# Patient Record
Sex: MALE | URBAN - METROPOLITAN AREA
[De-identification: names, ages, dates, MRNs, and addresses within clinical notes are randomized per-mention and may not be internally consistent; named-entity substitution may affect disease eponyms.]

---

## 2022-07-28 ENCOUNTER — APPOINTMENT (RX ONLY)
Dept: URBAN - METROPOLITAN AREA CLINIC 45 | Facility: CLINIC | Age: 52
Setting detail: DERMATOLOGY
End: 2022-07-28

## 2022-07-28 DIAGNOSIS — L50.3 DERMATOGRAPHIC URTICARIA: ICD-10-CM

## 2022-07-28 DIAGNOSIS — L71.8 OTHER ROSACEA: ICD-10-CM

## 2022-07-28 PROCEDURE — ? COUNSELING

## 2022-07-28 PROCEDURE — ? TREATMENT REGIMEN

## 2022-07-28 PROCEDURE — ? PRESCRIPTION

## 2022-07-28 PROCEDURE — 99203 OFFICE O/P NEW LOW 30 MIN: CPT

## 2022-07-28 RX ORDER — OXYMETAZOLINE HYDROCHLORIDE 1 G/100G
CREAM TOPICAL
Qty: 30 | Refills: 3 | Status: ERX | COMMUNITY
Start: 2022-07-28

## 2022-07-28 RX ORDER — DOXYCYCLINE HYCLATE 200 MG/1
TABLET, DELAYED RELEASE ORAL
Qty: 30 | Refills: 3 | Status: ERX | COMMUNITY
Start: 2022-07-28

## 2022-07-28 RX ORDER — SULFACETAMIDE SODIUM, SULFUR 98; 48 MG/G; MG/G
LIQUID TOPICAL
Qty: 285 | Refills: 3 | Status: ERX | COMMUNITY
Start: 2022-07-28

## 2022-07-28 RX ADMIN — OXYMETAZOLINE HYDROCHLORIDE: 1 CREAM TOPICAL at 00:00

## 2022-07-28 RX ADMIN — SULFACETAMIDE SODIUM, SULFUR: 98; 48 LIQUID TOPICAL at 00:00

## 2022-07-28 RX ADMIN — DOXYCYCLINE HYCLATE: 200 TABLET, DELAYED RELEASE ORAL at 00:00

## 2022-07-28 NOTE — PROCEDURE: TREATMENT REGIMEN
Plan: Location: body\\n\\nDermatographism was drawn to detect histamine level. \\nPatient has high histamine level and advised to start taking Allegra BID, especially during allergy season.\\nF/u as needed
Detail Level: Zone
Plan: Location: face\\nPrescribe: Doryx 200mg ( patient is to take one tablet daily or as needed) \\nPlexion sulfa Cleanser ( patient is to lather to skin allow to sit for 30 seconds and then rinse) \\nRhofade topical cream ( patient is to apply to affected areas every morning) \\n\\n\\n\\nPatient presents with erythema. \\nDiscussed with the patient that it is an immune mediated condition that irritates the blood vessels and oil glands\\nEducated patient on trigger factors such as stress, spicy foods, alcohol, sunlight, etc.\\nRecommend using sunscreen with zinc oxide to help prevent trigger factors from activating\\n\\n\\nPatient currently has a prescription from a former dermatologist that she is to continue to use, within tins compounding medication there is Azalaic acid, metronidazole and ivermectin. \\nDiscussed with patient that we will also prescribe patient Rhofade cream and Doryx 200mg to be used daily to help with inflammation and irritation. \\Aurelio addition to this treatment regimen, will prescribe patient Plexion sulfa Cleanser (10-30 sec) to further help calm down inflammation.\\n\\n\\n\\nPLAN: \\n\\n1. Patient is to continue compounding medication daily that was given to her by her former dermatologist \\n2. Patient is to apply Rhofade topical cream every morning \\n3. Patient is to take oral Doryx 200mg daily with food \\n4. Patient is to cleanse face with Plexion topical cleanser daily \\n5. Patient is to take over the counter oral antihistamine (3) tablets daily \\n\\n\\n\\nPatient is to follow up in 3 months

## 2022-08-04 ENCOUNTER — RX ONLY (OUTPATIENT)
Age: 52
Setting detail: RX ONLY
End: 2022-08-04

## 2022-08-04 RX ORDER — DOXYCYCLINE HYCLATE 100 MG/1
CAPSULE, GELATIN COATED ORAL
Qty: 60 | Refills: 3 | Status: ERX | COMMUNITY
Start: 2022-08-04

## 2022-10-27 ENCOUNTER — APPOINTMENT (RX ONLY)
Dept: URBAN - METROPOLITAN AREA CLINIC 45 | Facility: CLINIC | Age: 52
Setting detail: DERMATOLOGY
End: 2022-10-27

## 2022-10-27 DIAGNOSIS — L71.8 OTHER ROSACEA: ICD-10-CM

## 2022-10-27 DIAGNOSIS — L98.8 OTHER SPECIFIED DISORDERS OF THE SKIN AND SUBCUTANEOUS TISSUE: ICD-10-CM

## 2022-10-27 DIAGNOSIS — L50.3 DERMATOGRAPHIC URTICARIA: ICD-10-CM

## 2022-10-27 PROCEDURE — ? PRESCRIPTION

## 2022-10-27 PROCEDURE — ? TREATMENT REGIMEN

## 2022-10-27 PROCEDURE — ? COUNSELING

## 2022-10-27 PROCEDURE — 99213 OFFICE O/P EST LOW 20 MIN: CPT

## 2022-10-27 RX ORDER — DOXYCYCLINE HYCLATE 200 MG/1
TABLET, DELAYED RELEASE ORAL
Qty: 30 | Refills: 3 | Status: ERX

## 2022-10-27 RX ORDER — OXYMETAZOLINE HYDROCHLORIDE 1 G/100G
CREAM TOPICAL
Qty: 30 | Refills: 3 | Status: ERX

## 2022-10-27 RX ORDER — SULFACETAMIDE SODIUM, SULFUR 98; 48 MG/G; MG/G
LIQUID TOPICAL
Qty: 285 | Refills: 3 | Status: ERX

## 2022-10-27 NOTE — PROCEDURE: TREATMENT REGIMEN
Detail Level: Zone
Plan: Location: face\\nPrescribe: Doryx 200mg ( patient is to take one tablet daily or as needed) \\nPlexion sulfa Cleanser ( patient is to lather to skin allow to sit for 30 seconds and then rinse) \\nRhofade topical cream ( patient is to apply to affected areas every morning) \\n\\n\\n10/27/2022\\n\\n\\nPatient is here for a follow up on rosacea \\nPatient was previously prescribed Doryx 20mg, Plexion topical cleanser and the Rhofade topical cream \\nPatient states she was on oral Doryx 200mg daily for 6 weeks and was experiencing back aches, headaches and nausea. \\nShe states she then got Covid and was off of the medication completely while sick and once she felt better attempted to get back on the oral antibiotic and immediately started feeling the same symptoms. \\nDiscussed with the patient I want her to attempt to break the tablet in half and see if no symptoms are noticeable, if she does experience same symptoms she is to get off completely.\\nThe patient also complains of puffiness around her eyes, discussed with the patient this could potentially be ocular rosacea.\\nDiscussed with the patient a 3rd of the patient experience puffy eyes due to this condition. \\nDiscussed with the patient if while taking oral antibiotic she notices improvement with the puffiness then continue to or attempt taking oral antibiotic and if not she should discontinue completely. \\nShe also takes oral antihistamine daily, she feels like this has also played in role in helping with her flares.\\nPatient was instructed to continue daily regimen and follow up as instructed. \\n\\nPLAN: \\n\\n\\n1. Patient is to apply Rhofade topical cream every morning \\n2. Patient is to take oral Doryx 200mg/ or cut into half's and daily with food \\n3. Patient is to cleanse face with Plexion topical cleanser daily \\n4. Patient is to take over the counter oral antihistamine (3) tablets daily \\n\\n\\n\\nPatient is to follow up in 2 months\\n————————\\n\\nPatient presents with erythema. \\nDiscussed with the patient that it is an immune mediated condition that irritates the blood vessels and oil glands\\nEducated patient on trigger factors such as stress, spicy foods, alcohol, sunlight, etc.\\nRecommend using sunscreen with zinc oxide to help prevent trigger factors from activating\\n\\n\\nPatient currently has a prescription from a former dermatologist that she is to continue to use, within tins compounding medication there is Azalaic acid, metronidazole and ivermectin. \\nDiscussed with patient that we will also prescribe patient Rhofade cream and Doryx 200mg to be used daily to help with inflammation and irritation. \\Aurelio addition to this treatment regimen, will prescribe patient Plexion sulfa Cleanser (10-30 sec) to further help calm down inflammation.\\n\\n\\n\\nPLAN: \\n\\n1. Patient is to continue compounding medication daily that was given to her by her former dermatologist \\n2. Patient is to apply Rhofade topical cream every morning \\n3. Patient is to take oral Doryx 200mg daily with food \\n4. Patient is to cleanse face with Plexion topical cleanser daily \\n5. Patient is to take over the counter oral antihistamine (3) tablets daily \\n\\n\\n\\nPatient is to follow up in 3 months
Plan: Location: body\\n\\nDermatographism was drawn to detect histamine level. \\nPatient has high histamine level and advised to start taking Allegra BID, especially during allergy season.\\nF/u as needed
Plan: Location: Glabella, forehead and crows feet \\nPlan: 32-38 units of Botox \\n\\n\\nDiscussed with the patient she may need anywhere from 32-38 units of Botox to provide her with the results she requesting.\\n\\n\\nShe is to follow up in 2 months

## 2022-12-15 ENCOUNTER — APPOINTMENT (RX ONLY)
Dept: URBAN - METROPOLITAN AREA CLINIC 45 | Facility: CLINIC | Age: 52
Setting detail: DERMATOLOGY
End: 2022-12-15

## 2022-12-15 DIAGNOSIS — L71.8 OTHER ROSACEA: ICD-10-CM

## 2022-12-15 DIAGNOSIS — L98.8 OTHER SPECIFIED DISORDERS OF THE SKIN AND SUBCUTANEOUS TISSUE: ICD-10-CM

## 2022-12-15 PROCEDURE — 99213 OFFICE O/P EST LOW 20 MIN: CPT

## 2022-12-15 PROCEDURE — ? BOTOX

## 2022-12-15 PROCEDURE — ? PRESCRIPTION

## 2022-12-15 PROCEDURE — ? TREATMENT REGIMEN

## 2022-12-15 PROCEDURE — ? COUNSELING

## 2022-12-15 RX ORDER — OXYMETAZOLINE HYDROCHLORIDE 1 G/100G
CREAM TOPICAL
Qty: 30 | Refills: 3 | Status: ERX

## 2022-12-15 RX ORDER — SULFACETAMIDE SODIUM, SULFUR 98; 48 MG/G; MG/G
LIQUID TOPICAL
Qty: 285 | Refills: 3 | Status: ERX

## 2022-12-15 NOTE — PROCEDURE: TREATMENT REGIMEN
Detail Level: Zone
Plan: Location: face\\nPrescribe: Plexion sulfa Cleanser ( patient is to lather to skin allow to sit for 30 seconds and then rinse) \\n                  Rhofade topical cream ( patient is to apply to affected areas every morning) \\nDISCONTINUED: Doryx 200mg ( patient is to take one tablet daily or as needed) \\n\\n\\nPhotos taken \\nLOV 10/27/2022\\n\\n\\nPatient is here for a follow up on rosacea \\nPatient has discontinued Doryx 200mg tablet due to the side effects; back aches, headaches and nausea. \\nShe does use the Plexion topical cleanser and the Rhofade topical cream daily and is very pleased \\nDiscussed with the patient her skin has improved and does not have so much erythema \\n\\nThe patient also complains of puffiness around her eyes\\nDiscussed with the patient a third of the patient experience puffy eyes due to this condition. \\nDiscussed with the patient if while taking oral antibiotic she notices improvement with the puffiness then continue to or attempt taking oral antibiotic and if not she should discontinue completely. \\nShe also takes oral antihistamine daily, she feels like this has also played in role in helping with her flares.\\nPatient was instructed to continue daily regimen and follow up as instructed. \\n\\nPLAN: \\n\\n\\n1. Patient is to apply Rhofade topical cream every morning \\n2. Patient is to discontinue Doryx 200mg/ can take half a tablet for major flares\\n3. Patient is to cleanse face with Plexion topical cleanser daily \\n4. Patient is to take over the counter oral antihistamine (3) tablets daily \\n\\n\\n\\nPatient is to follow up in 6 months\\n————————\\n\\nPatient presents with erythema. \\nDiscussed with the patient that it is an immune mediated condition that irritates the blood vessels and oil glands\\nEducated patient on trigger factors such as stress, spicy foods, alcohol, sunlight, etc.\\nRecommend using sunscreen with zinc oxide to help prevent trigger factors from activating\\n\\n\\nPatient currently has a prescription from a former dermatologist that she is to continue to use, within tins compounding medication there is Azalaic acid, metronidazole and ivermectin. \\nDiscussed with patient that we will also prescribe patient Rhofade cream and Doryx 200mg to be used daily to help with inflammation and irritation. \\Aurelio addition to this treatment regimen, will prescribe patient Plexion sulfa Cleanser (10-30 sec) to further help calm down inflammation.\\n\\n\\n\\nPLAN: \\n\\n1. Patient is to continue compounding medication daily that was given to her by her former dermatologist \\n2. Patient is to apply Rhofade topical cream every morning \\n3. Patient is to take oral Doryx 200mg daily with food \\n4. Patient is to cleanse face with Plexion topical cleanser daily \\n5. Patient is to take over the counter oral antihistamine (3) tablets daily \\n\\n\\n\\nPatient is to follow up in 3 months
Plan: Location: Glabella, forehead and crows feet \\nPlan: Botox 36 units and charged 32 units \\n\\n\\nDiscussed with the patient today we will administer 36 units of Botox but will charge for 32 units to provide her with the results she is looking for \\n\\n\\nShe is to follow up in 2 weeks

## 2022-12-15 NOTE — PROCEDURE: BOTOX
Show Masseter Units: Yes
Left Pupillary Line Units: 0
Post-Care Instructions: Patient instructed to not lie down for 4 hours and limit physical activity for 24 hours.
Show Right And Left Periorbital Units: No
Dilution (U/0.1 Cc): 4
Price (Use Numbers Only, No Special Characters Or $): 586
Detail Level: Detailed
Consent: Written consent obtained. Risks include but not limited to lid/brow ptosis, bruising, swelling, diplopia, temporary effect, incomplete chemical denervation.

## 2022-12-29 ENCOUNTER — APPOINTMENT (RX ONLY)
Dept: URBAN - METROPOLITAN AREA CLINIC 77 | Facility: CLINIC | Age: 52
Setting detail: DERMATOLOGY
End: 2022-12-29

## 2022-12-29 DIAGNOSIS — L98.8 OTHER SPECIFIED DISORDERS OF THE SKIN AND SUBCUTANEOUS TISSUE: ICD-10-CM

## 2022-12-29 PROCEDURE — ? COUNSELING

## 2022-12-29 PROCEDURE — ? TREATMENT REGIMEN

## 2022-12-29 NOTE — PROCEDURE: TREATMENT REGIMEN
Plan: Location: Glabella, forehead and crows feet \\nPreviously Injected: Botox 36 units and charged 32 units \\n\\n\\[id number]\\n\\nPt comes in today for a follow up on Botox.\\nShe states that she still sees some movement right above her eyebrow.\\n\\nDiscussed with pt that upon further examination she does present with some minor movement.\\nShowed her photos to compare her Initial visit.\\nToday,  I will administer 4 units of Botox to help calm down the movement.\\nRecommend she follows up every 4 months \\n\\n\\nShe is to follow up in 2 weeks
Detail Level: Zone

## 2023-04-19 ENCOUNTER — APPOINTMENT (RX ONLY)
Dept: URBAN - METROPOLITAN AREA CLINIC 45 | Facility: CLINIC | Age: 53
Setting detail: DERMATOLOGY
End: 2023-04-19

## 2023-04-19 DIAGNOSIS — L98.8 OTHER SPECIFIED DISORDERS OF THE SKIN AND SUBCUTANEOUS TISSUE: ICD-10-CM

## 2023-04-19 PROCEDURE — ? BOTOX

## 2023-04-19 PROCEDURE — ? TREATMENT REGIMEN

## 2023-04-19 PROCEDURE — ? COUNSELING

## 2023-04-19 NOTE — PROCEDURE: MIPS QUALITY
Quality 431: Preventive Care And Screening: Unhealthy Alcohol Use - Screening: Patient not identified as an unhealthy alcohol user when screened for unhealthy alcohol use using a systematic screening method
Quality 130: Documentation Of Current Medications In The Medical Record: Current Medications Documented
Quality 402: Tobacco Use And Help With Quitting Among Adolescents: Patient screened for tobacco and never smoked
Detail Level: Detailed
Quality 110: Preventive Care And Screening: Influenza Immunization: Influenza Immunization Administered during Influenza season

## 2023-04-19 NOTE — PROCEDURE: TREATMENT REGIMEN
Plan: Location: Glabella, forehead and crows feet \\nPreviously Injected: Botox 36 units and charged 32 units, pt then came back for 4 more units \\nToday I administered 46 units and pt will pay for 40 units \\n\\n\\[id number]\\n\\nPt comes in today for Botox.\\nShe states that she still sees some movement right above her eyebrow.\\n\\nDiscussed with pt that upon further examination and comparing photos there is improvement \\nToday,  I will administer 46 units of Botox to help calm down the movement, she will pay for 40 units \\nShe will start at 46 units next visit \\nRecommend she follows up every 4 months \\n\\n\\nShe is to follow up in 2 weeks
Detail Level: Zone

## 2023-04-19 NOTE — PROCEDURE: BOTOX
Right Periorbital Units: 0
Show Topical Anesthesia: Yes
Show Mentalis Units: No
Detail Level: Detailed
Incrementing Botox Units: By 0.5 Units
Dilution (U/0.1 Cc): 4
Price (Use Numbers Only, No Special Characters Or $): 507
Consent: Written consent obtained. Risks include but not limited to lid/brow ptosis, bruising, swelling, diplopia, temporary effect, incomplete chemical denervation.
Post-Care Instructions: Patient instructed to not lie down for 4 hours and limit physical activity for 24 hours.

## 2023-05-04 ENCOUNTER — APPOINTMENT (RX ONLY)
Dept: URBAN - METROPOLITAN AREA CLINIC 45 | Facility: CLINIC | Age: 53
Setting detail: DERMATOLOGY
End: 2023-05-04

## 2023-05-04 DIAGNOSIS — L98.8 OTHER SPECIFIED DISORDERS OF THE SKIN AND SUBCUTANEOUS TISSUE: ICD-10-CM

## 2023-05-04 PROCEDURE — ? BOTOX

## 2023-05-04 PROCEDURE — ? TREATMENT REGIMEN

## 2023-05-04 PROCEDURE — ? COUNSELING

## 2023-05-04 NOTE — PROCEDURE: BOTOX
Right Periorbital Units: 0
Show Topical Anesthesia: Yes
Show Mentalis Units: No
Detail Level: Detailed
Incrementing Botox Units: By 0.5 Units
Dilution (U/0.1 Cc): 4
Price (Use Numbers Only, No Special Characters Or $): 129
Consent: Written consent obtained. Risks include but not limited to lid/brow ptosis, bruising, swelling, diplopia, temporary effect, incomplete chemical denervation.
Post-Care Instructions: Patient instructed to not lie down for 4 hours and limit physical activity for 24 hours.

## 2023-05-04 NOTE — PROCEDURE: TREATMENT REGIMEN
Plan: Location: Glabella, forehead and crows feet \\nPreviously Injected: Botox 36 units and charged 32 units, pt then came back for 4 more units \\nToday I administered 46 units and pt will pay for 40 units \\n\\n\\[id number]\\n\\nPt comes in today for Botox.\\nShe states that she still sees some movement right above her eyebrow.\\n\\nDiscussed with pt that upon further examination and comparing photos there is improvement \\nToday,  I will administer 46 units of Botox to help calm down the movement, she will pay for 40 units \\nShe will start at 46 units next visit \\nRecommend she follows up every 4 months \\n\\n\\nShe is to follow up in 2 weeks
Detail Level: Zone